# Patient Record
Sex: FEMALE | URBAN - METROPOLITAN AREA
[De-identification: names, ages, dates, MRNs, and addresses within clinical notes are randomized per-mention and may not be internally consistent; named-entity substitution may affect disease eponyms.]

---

## 2020-07-22 ENCOUNTER — APPOINTMENT (RX ONLY)
Dept: URBAN - METROPOLITAN AREA OTHER 11 | Facility: OTHER | Age: 65
Setting detail: DERMATOLOGY
End: 2020-07-22

## 2020-07-22 DIAGNOSIS — L0391 CELLULITIS AND ABSCESS OF UNSPECIFIED SITES: ICD-10-CM

## 2020-07-22 DIAGNOSIS — H01.13 ECZEMATOUS DERMATITIS OF EYELID: ICD-10-CM

## 2020-07-22 DIAGNOSIS — L0390 CELLULITIS AND ABSCESS OF UNSPECIFIED SITES: ICD-10-CM

## 2020-07-22 DIAGNOSIS — L30.9 DERMATITIS, UNSPECIFIED: ICD-10-CM

## 2020-07-22 PROBLEM — H01.139 ECZEMATOUS DERMATITIS OF UNSPECIFIED EYE, UNSPECIFIED EYELID: Status: ACTIVE | Noted: 2020-07-22

## 2020-07-22 PROBLEM — L03.115 CELLULITIS OF RIGHT LOWER LIMB: Status: ACTIVE | Noted: 2020-07-22

## 2020-07-22 PROCEDURE — ? PRESCRIPTION MEDICATION MANAGEMENT

## 2020-07-22 PROCEDURE — 99203 OFFICE O/P NEW LOW 30 MIN: CPT

## 2020-07-22 PROCEDURE — ? ORDER TESTS

## 2020-07-22 PROCEDURE — ? COUNSELING

## 2020-07-22 PROCEDURE — ? PRESCRIPTION

## 2020-07-22 RX ORDER — MUPIROCIN 20 MG/G
- OINTMENT TOPICAL
Qty: 3 | Refills: 3 | Status: ERX | COMMUNITY
Start: 2020-07-22

## 2020-07-22 RX ORDER — CLOBETASOL PROPIONATE 0.5 MG/G
- OINTMENT TOPICAL BID
Qty: 1 | Refills: 3 | Status: ERX | COMMUNITY
Start: 2020-07-22

## 2020-07-22 RX ORDER — CEPHALEXIN 500 MG/1
TABLET ORAL TID
Qty: 42 | Refills: 0 | Status: ERX | COMMUNITY
Start: 2020-07-22

## 2020-07-22 RX ORDER — HYDROCORTISONE 25 MG/G
- OINTMENT TOPICAL
Qty: 1 | Refills: 3 | Status: ERX | COMMUNITY
Start: 2020-07-22

## 2020-07-22 RX ADMIN — CLOBETASOL PROPIONATE -: 0.5 OINTMENT TOPICAL at 00:00

## 2020-07-22 RX ADMIN — CEPHALEXIN -: 500 TABLET ORAL at 00:00

## 2020-07-22 RX ADMIN — HYDROCORTISONE -: 25 OINTMENT TOPICAL at 00:00

## 2020-07-22 RX ADMIN — MUPIROCIN -: 20 OINTMENT TOPICAL at 00:00

## 2020-07-22 ASSESSMENT — LOCATION ZONE DERM: LOCATION ZONE: LEG

## 2020-07-22 ASSESSMENT — LOCATION SIMPLE DESCRIPTION DERM: LOCATION SIMPLE: RIGHT ANKLE

## 2020-07-22 ASSESSMENT — LOCATION DETAILED DESCRIPTION DERM: LOCATION DETAILED: RIGHT ANKLE

## 2020-07-22 NOTE — PROCEDURE: PRESCRIPTION MEDICATION MANAGEMENT
Plan: Doctors note for time off work until her condition improves.\\nPossible id reaction to RLE cellulitis and/or possible component of atypical lichenoid reaction. Change class of topical steroid. Consider biopsy, pending course. Consider patch testing in one month, after off systemic steroids x4wks.  Consider referral to [facility] for additional evaluation, allergy testing for history of severe seasonal eyelid dermatitis.  Pt may require additional evaluation for antecedent distal RLE swelling after cellulitis is resolved.
Otc Regimen: Zyrtec 10mg bid
Render In Strict Bullet Format?: No
Detail Level: Zone

## 2020-07-22 NOTE — PROCEDURE: COUNSELING
Detail Level: Zone
Patient Specific Counseling (Will Not Stick From Patient To Patient): I explained to the patient this is possibly an Id reaction from her RLE cellulitis. It could also be a photosensitivity dermatitis vs. an allergic contact dermatitis. We will prescribe the patient clobetasol 0.05% ointment to apply to her arms and hydrocortisone 2.5% ointment to apply to her face and thigh.
Patient Specific Counseling (Will Not Stick From Patient To Patient): Crust on RLE was unroofed and a culture was taken. Antibiotics will be adjusted depending on the results of the culture. No sinus tract/fistulae noted. Pt is able to move the ankle.
Detail Level: Simple

## 2020-08-20 ENCOUNTER — APPOINTMENT (RX ONLY)
Dept: URBAN - METROPOLITAN AREA OTHER 11 | Facility: OTHER | Age: 65
Setting detail: DERMATOLOGY
End: 2020-08-20

## 2020-08-20 DIAGNOSIS — L0390 CELLULITIS AND ABSCESS OF UNSPECIFIED SITES: ICD-10-CM

## 2020-08-20 DIAGNOSIS — L0391 CELLULITIS AND ABSCESS OF UNSPECIFIED SITES: ICD-10-CM

## 2020-08-20 DIAGNOSIS — L23.9 ALLERGIC CONTACT DERMATITIS, UNSPECIFIED CAUSE: ICD-10-CM

## 2020-08-20 DIAGNOSIS — L30.9 DERMATITIS, UNSPECIFIED: ICD-10-CM

## 2020-08-20 DIAGNOSIS — H01.13 ECZEMATOUS DERMATITIS OF EYELID: ICD-10-CM | Status: RESOLVED

## 2020-08-20 DIAGNOSIS — L81.0 POSTINFLAMMATORY HYPERPIGMENTATION: ICD-10-CM

## 2020-08-20 PROBLEM — L03.115 CELLULITIS OF RIGHT LOWER LIMB: Status: ACTIVE | Noted: 2020-08-20

## 2020-08-20 PROBLEM — H01.139 ECZEMATOUS DERMATITIS OF UNSPECIFIED EYE, UNSPECIFIED EYELID: Status: ACTIVE | Noted: 2020-08-20

## 2020-08-20 PROCEDURE — ? PRESCRIPTION MEDICATION MANAGEMENT

## 2020-08-20 PROCEDURE — ? COUNSELING

## 2020-08-20 PROCEDURE — 99213 OFFICE O/P EST LOW 20 MIN: CPT

## 2020-08-20 ASSESSMENT — LOCATION DETAILED DESCRIPTION DERM
LOCATION DETAILED: RIGHT DORSAL FOOT
LOCATION DETAILED: RIGHT DISTAL DORSAL FOREARM
LOCATION DETAILED: LEFT VENTRAL DISTAL FOREARM
LOCATION DETAILED: LEFT DISTAL DORSAL FOREARM

## 2020-08-20 ASSESSMENT — LOCATION SIMPLE DESCRIPTION DERM
LOCATION SIMPLE: RIGHT FOREARM
LOCATION SIMPLE: RIGHT FOOT
LOCATION SIMPLE: LEFT FOREARM

## 2020-08-20 ASSESSMENT — LOCATION ZONE DERM
LOCATION ZONE: FEET
LOCATION ZONE: ARM

## 2020-08-20 NOTE — PROCEDURE: PRESCRIPTION MEDICATION MANAGEMENT
Initiate Treatment: Clobetasol twice a day to the affected areas on the right foot
Discontinue Regimen: Mupirocin ointment
Plan: PC to Dr. Raj Bell, Foot care specialist at [facility], notes he has not been seeing patients since the 'hunker-down.'  He currently has no anticipated date to resume.  Will refer patient to [facility] for additional evaluation and management of the swelling on her distal right leg and foot. Asked to address if the swelling could be attributable to arthritis or other local bone or soft tissue disease as it antedated onset of dermatitis and cellulitis.\\nPast U/S of right leg reported to show no evidence of DVT.  JAM signed to obtain full report, status of evaluation for possible varicosities contributing to distal extremity swelling, dermatitis.
Detail Level: Simple
Render In Strict Bullet Format?: No
Initiate Treatment: Clobetasol bid to right foot
Detail Level: Zone
Modify Regimen: Decrease Clobetasol ointment to once a day on the forearms, taper off if remaining stable
Continue Regimen: Clobetasol
Plan: Arms most consistant with id reaction to contact dermatitis on foot, resolving.
Initiate Treatment: Clobetasol bid to affected areas until erythema, scaling improved, then taper down.  Potential for post inflammatory hyperpigmentation reviewed.

## 2020-08-20 NOTE — HPI: RASH
Is The Patient Presenting As Previously Scheduled?: Yes
How Severe Is Your Rash?: moderate
Is This A New Presentation, Or A Follow-Up?: Follow Up Rash
Additional History: Patient notes she has used the clobetasol twice daily to the arms, with significant improvement, but remaining discoloration.  She has used mupirocin only to the affected areas on the lower leg/ foot with resolution of crusting, oozing, but continued erythema, finer scale, and swelling.

## 2020-08-20 NOTE — HPI: INFECTION (CELLULITIS)
Is This A New Presentation, Or A Follow-Up?: Follow Up Cellulitis
Additional History: Pt notes she has had swelling in the right foot and ankle of unclear etiology for several months.  She has had U/S to r/o DVT.  No detailed report in records regarding evaluation for varicosities.  Pt notes she has been to podiatry, and has ankle arthritis. She has had the joint injected once in recent months.

## 2020-08-20 NOTE — PROCEDURE: COUNSELING
Detail Level: Zone
Detail Level: Detailed
Patient Specific Counseling (Will Not Stick From Patient To Patient): Patient has not heard from allergy clinic referral, yet.  She has been off systemic steroids for a month, and is not taking antihistamines.  Will re-send consult, start TRUE test patch testing in interim.
Detail Level: Simple
Patient Specific Counseling (Will Not Stick From Patient To Patient): Geometric distribution today c/w underlying contact dermatitis. Proceed with patch testing, as noted.  Pending outcome, may need additional expanded shoe/foot series with [facility], as well as evaluation for hx c/w environmental allergies, other potential expanded contact testing.  JAM for U/S report to evaluate potential component of stasis - see also above

## 2020-08-24 ENCOUNTER — APPOINTMENT (RX ONLY)
Dept: URBAN - METROPOLITAN AREA OTHER 11 | Facility: OTHER | Age: 65
Setting detail: DERMATOLOGY
End: 2020-08-24

## 2020-08-24 DIAGNOSIS — L30.9 DERMATITIS, UNSPECIFIED: ICD-10-CM

## 2020-08-24 PROCEDURE — ? OTHER

## 2020-08-24 PROCEDURE — 95044 PATCH/APPLICATION TESTS: CPT

## 2020-08-24 PROCEDURE — ? PATCH TESTING

## 2020-08-24 ASSESSMENT — LOCATION ZONE DERM: LOCATION ZONE: TRUNK

## 2020-08-24 ASSESSMENT — LOCATION DETAILED DESCRIPTION DERM: LOCATION DETAILED: LEFT MID-UPPER BACK

## 2020-08-24 ASSESSMENT — LOCATION SIMPLE DESCRIPTION DERM: LOCATION SIMPLE: LEFT UPPER BACK

## 2020-08-24 NOTE — PROCEDURE: OTHER
Note Text (......Xxx Chief Complaint.): This diagnosis correlates with the
Other (Free Text): MA follow up Wednesday where we will be doing the preliminary reading, followed by the doctor's follow up on Thursday
Detail Level: Simple

## 2020-08-26 ENCOUNTER — APPOINTMENT (RX ONLY)
Dept: URBAN - METROPOLITAN AREA OTHER 11 | Facility: OTHER | Age: 65
Setting detail: DERMATOLOGY
End: 2020-08-26

## 2020-08-26 DIAGNOSIS — L30.9 DERMATITIS, UNSPECIFIED: ICD-10-CM

## 2020-08-26 PROCEDURE — ? TRUE TEST READING

## 2020-08-26 NOTE — PROCEDURE: TRUE TEST READING
Show Allergen Counseling In The Note?: Yes
30 - Budesonide: no reaction
Detail Level: Zone
27 - Tixocortol-21-Pivalate: 1+
What Reading Time Point?: 48 hour
Number Of Patches Read: 36

## 2020-08-27 ENCOUNTER — APPOINTMENT (RX ONLY)
Dept: URBAN - METROPOLITAN AREA OTHER 11 | Facility: OTHER | Age: 65
Setting detail: DERMATOLOGY
End: 2020-08-27

## 2020-08-27 DIAGNOSIS — L30.9 DERMATITIS, UNSPECIFIED: ICD-10-CM

## 2020-08-27 DIAGNOSIS — L30.0 NUMMULAR DERMATITIS: ICD-10-CM

## 2020-08-27 PROCEDURE — ? COUNSELING

## 2020-08-27 PROCEDURE — ? PRESCRIPTION MEDICATION MANAGEMENT

## 2020-08-27 PROCEDURE — ? TRUE TEST READING

## 2020-08-27 PROCEDURE — 99213 OFFICE O/P EST LOW 20 MIN: CPT

## 2020-08-27 ASSESSMENT — LOCATION SIMPLE DESCRIPTION DERM: LOCATION SIMPLE: LEFT ANTERIOR NECK

## 2020-08-27 ASSESSMENT — LOCATION ZONE DERM: LOCATION ZONE: NECK

## 2020-08-27 ASSESSMENT — LOCATION DETAILED DESCRIPTION DERM: LOCATION DETAILED: LEFT CLAVICULAR NECK

## 2020-08-27 NOTE — PROCEDURE: PRESCRIPTION MEDICATION MANAGEMENT
Plan: Patient markedly improved.  \\nId component resolved, with PIH. Distal RLE edema diminished.  Pt notes s/p evaluation with [facility]. Confirmed diagnosis of ankle arthritis and reports that is probable underlying cause of lower leg swelling.\\nPt does work long days on her feet.  Recommend patient wears knee-high compression socks daily. (D/C if swelling above knee-high, would then consider thigh-high or pantyhose-style compression garment)\\nDermatitis on lower leg, foot nearly resolved, with PIH.  Continue taper of clobetasol, as noted previously.\\nPatient to keep appointment with [facility] - consider extended allergy testing, as previously noted.\\nPatient reports she has a pending hip replacement. Encouraged patient to review implications, potential additional evaluation as per the allergy clinic, and to discuss with her surgeon.
Continue Regimen: Hydrocortisone ointment bid x 2 weeks then discontinue
Discontinue Regimen: Clobetasol ointment\\nDove soap
Render In Strict Bullet Format?: No
Samples Given: CeraVe cream/ cleanser and vanicream
Detail Level: Zone
Initiate Treatment: Hydrocortisone ointment bid prn x 2 weeks. Taper down when improved (Pt responded well to hydrocortisone for eyelid dermatitis previously, despite tixocortal pivolate reaction)

## 2020-08-27 NOTE — PROCEDURE: TRUE TEST READING
24 - Thiuram Mix: no reaction
7 - Colophony: 2+
Show Allergen Counseling In The Note?: Yes
Detail Level: Zone
What Reading Time Point?: 72 hour
Number Of Patches Read: 36
3 - Neomycin Sulfate: 1+

## 2020-08-27 NOTE — HPI: TESTING (PATCH TESTING)
Has Your Rash Been Biopsied Before?: has not been biopsied previously
Have You Had Previous Patch Testing In The Past?: true test
How Severe Is Your Rash At Its Worst?: severe

## 2020-08-27 NOTE — PROCEDURE: COUNSELING
Patient Specific Counseling (Will Not Stick From Patient To Patient): Patient is given TRUE test handouts on positive tests, reviewed.
Detail Level: Zone